# Patient Record
Sex: FEMALE | Race: WHITE | NOT HISPANIC OR LATINO | URBAN - METROPOLITAN AREA
[De-identification: names, ages, dates, MRNs, and addresses within clinical notes are randomized per-mention and may not be internally consistent; named-entity substitution may affect disease eponyms.]

---

## 2022-11-16 ENCOUNTER — INPATIENT (INPATIENT)
Facility: HOSPITAL | Age: 22
LOS: 2 days | Discharge: HOME | End: 2022-11-19
Attending: SURGERY | Admitting: SURGERY

## 2022-11-16 VITALS
RESPIRATION RATE: 20 BRPM | HEIGHT: 63 IN | HEART RATE: 100 BPM | OXYGEN SATURATION: 99 % | DIASTOLIC BLOOD PRESSURE: 58 MMHG | TEMPERATURE: 98 F | SYSTOLIC BLOOD PRESSURE: 119 MMHG | WEIGHT: 116.18 LBS

## 2022-11-16 LAB
ALBUMIN SERPL ELPH-MCNC: 4.6 G/DL — SIGNIFICANT CHANGE UP (ref 3.5–5.2)
ALP SERPL-CCNC: 116 U/L — HIGH (ref 30–115)
ALT FLD-CCNC: 89 U/L — HIGH (ref 0–41)
ANION GAP SERPL CALC-SCNC: 9 MMOL/L — SIGNIFICANT CHANGE UP (ref 7–14)
AST SERPL-CCNC: 209 U/L — HIGH (ref 0–41)
BASOPHILS # BLD AUTO: 0.03 K/UL — SIGNIFICANT CHANGE UP (ref 0–0.2)
BASOPHILS NFR BLD AUTO: 0.3 % — SIGNIFICANT CHANGE UP (ref 0–1)
BILIRUB SERPL-MCNC: 0.5 MG/DL — SIGNIFICANT CHANGE UP (ref 0.2–1.2)
BUN SERPL-MCNC: 15 MG/DL — SIGNIFICANT CHANGE UP (ref 10–20)
CALCIUM SERPL-MCNC: 9.9 MG/DL — SIGNIFICANT CHANGE UP (ref 8.4–10.4)
CHLORIDE SERPL-SCNC: 104 MMOL/L — SIGNIFICANT CHANGE UP (ref 98–110)
CO2 SERPL-SCNC: 28 MMOL/L — SIGNIFICANT CHANGE UP (ref 17–32)
CREAT SERPL-MCNC: 0.7 MG/DL — SIGNIFICANT CHANGE UP (ref 0.7–1.5)
D DIMER BLD IA.RAPID-MCNC: 264 NG/ML DDU — HIGH (ref 0–230)
EGFR: 124 ML/MIN/1.73M2 — SIGNIFICANT CHANGE UP
EOSINOPHIL # BLD AUTO: 0.04 K/UL — SIGNIFICANT CHANGE UP (ref 0–0.7)
EOSINOPHIL NFR BLD AUTO: 0.4 % — SIGNIFICANT CHANGE UP (ref 0–8)
GLUCOSE SERPL-MCNC: 97 MG/DL — SIGNIFICANT CHANGE UP (ref 70–99)
HCG SERPL QL: NEGATIVE — SIGNIFICANT CHANGE UP
HCT VFR BLD CALC: 39.2 % — SIGNIFICANT CHANGE UP (ref 37–47)
HGB BLD-MCNC: 12.7 G/DL — SIGNIFICANT CHANGE UP (ref 12–16)
IMM GRANULOCYTES NFR BLD AUTO: 0.3 % — SIGNIFICANT CHANGE UP (ref 0.1–0.3)
LYMPHOCYTES # BLD AUTO: 1.52 K/UL — SIGNIFICANT CHANGE UP (ref 1.2–3.4)
LYMPHOCYTES # BLD AUTO: 13.8 % — LOW (ref 20.5–51.1)
MAGNESIUM SERPL-MCNC: 2.1 MG/DL — SIGNIFICANT CHANGE UP (ref 1.8–2.4)
MCHC RBC-ENTMCNC: 26.6 PG — LOW (ref 27–31)
MCHC RBC-ENTMCNC: 32.4 G/DL — SIGNIFICANT CHANGE UP (ref 32–37)
MCV RBC AUTO: 82.2 FL — SIGNIFICANT CHANGE UP (ref 81–99)
MONOCYTES # BLD AUTO: 0.73 K/UL — HIGH (ref 0.1–0.6)
MONOCYTES NFR BLD AUTO: 6.6 % — SIGNIFICANT CHANGE UP (ref 1.7–9.3)
NEUTROPHILS # BLD AUTO: 8.65 K/UL — HIGH (ref 1.4–6.5)
NEUTROPHILS NFR BLD AUTO: 78.6 % — HIGH (ref 42.2–75.2)
NRBC # BLD: 0 /100 WBCS — SIGNIFICANT CHANGE UP (ref 0–0)
PLATELET # BLD AUTO: 370 K/UL — SIGNIFICANT CHANGE UP (ref 130–400)
POTASSIUM SERPL-MCNC: 4.3 MMOL/L — SIGNIFICANT CHANGE UP (ref 3.5–5)
POTASSIUM SERPL-SCNC: 4.3 MMOL/L — SIGNIFICANT CHANGE UP (ref 3.5–5)
PROT SERPL-MCNC: 7.2 G/DL — SIGNIFICANT CHANGE UP (ref 6–8)
RBC # BLD: 4.77 M/UL — SIGNIFICANT CHANGE UP (ref 4.2–5.4)
RBC # FLD: 13.4 % — SIGNIFICANT CHANGE UP (ref 11.5–14.5)
SODIUM SERPL-SCNC: 141 MMOL/L — SIGNIFICANT CHANGE UP (ref 135–146)
TROPONIN T SERPL-MCNC: <0.01 NG/ML — SIGNIFICANT CHANGE UP
WBC # BLD: 11 K/UL — HIGH (ref 4.8–10.8)
WBC # FLD AUTO: 11 K/UL — HIGH (ref 4.8–10.8)

## 2022-11-16 PROCEDURE — 93010 ELECTROCARDIOGRAM REPORT: CPT

## 2022-11-16 PROCEDURE — 74177 CT ABD & PELVIS W/CONTRAST: CPT | Mod: 26,MA

## 2022-11-16 PROCEDURE — 71275 CT ANGIOGRAPHY CHEST: CPT | Mod: 26,MA

## 2022-11-16 PROCEDURE — 71046 X-RAY EXAM CHEST 2 VIEWS: CPT | Mod: 26

## 2022-11-16 PROCEDURE — 99285 EMERGENCY DEPT VISIT HI MDM: CPT

## 2022-11-16 RX ORDER — FAMOTIDINE 10 MG/ML
20 INJECTION INTRAVENOUS ONCE
Refills: 0 | Status: COMPLETED | OUTPATIENT
Start: 2022-11-16 | End: 2022-11-16

## 2022-11-16 RX ORDER — KETOROLAC TROMETHAMINE 30 MG/ML
15 SYRINGE (ML) INJECTION ONCE
Refills: 0 | Status: DISCONTINUED | OUTPATIENT
Start: 2022-11-16 | End: 2022-11-16

## 2022-11-16 RX ORDER — SODIUM CHLORIDE 9 MG/ML
1000 INJECTION INTRAMUSCULAR; INTRAVENOUS; SUBCUTANEOUS ONCE
Refills: 0 | Status: COMPLETED | OUTPATIENT
Start: 2022-11-16 | End: 2022-11-16

## 2022-11-16 RX ORDER — SODIUM CHLORIDE 9 MG/ML
1000 INJECTION, SOLUTION INTRAVENOUS ONCE
Refills: 0 | Status: COMPLETED | OUTPATIENT
Start: 2022-11-16 | End: 2022-11-16

## 2022-11-16 RX ADMIN — SODIUM CHLORIDE 1000 MILLILITER(S): 9 INJECTION, SOLUTION INTRAVENOUS at 23:27

## 2022-11-16 NOTE — ED PROVIDER NOTE - ATTENDING APP SHARED VISIT CONTRIBUTION OF CARE
22-year-old female past medical history of liver issues during pregnancy presents with chest pain radiating to bilateral flanks for 1 month states over the last week has been more consistent pain is not producible no history of DVT PE no hematological disorders no family history of sudden cardiac deaths no leg pain.  Well appearing, NAD, non toxic. NCAT PERRLA EOMI neck supple non tender normal wob cta bl tachycardic abdomen s nt nd no rebound no guarding [id number] neuro non focal, patient is tachycardic with recent history of pregnancy will rule out PE.

## 2022-11-16 NOTE — ED PROVIDER NOTE - NS ED ATTENDING STATEMENT MOD
This was a shared visit with the MARISABEL. I reviewed and verified the documentation and independently performed the documented:

## 2022-11-16 NOTE — ED PROVIDER NOTE - OBJECTIVE STATEMENT
Pt is a 22 female with no PMH presents to ED with complaints of chest pain. Pt states for past 1 month has been having intermittent episodes of chest pain. Pt states over last 1 week pain has become more consistent. Pain is non reproducible. There is no alleviating or aggravating factors. Denies hx of PE/DVT, no heme disorders or FH, no FH of sudden cardiac death. Denies recent surgeries. Denies use of OCP and no recent travel. Denies fever, chills, bodyaches, sob, abdominal pain, NVCD

## 2022-11-16 NOTE — ED PROVIDER NOTE - PROGRESS NOTE DETAILS
nn: Liver enzymes elevated CT scan shows contracted gallbladder gallbladder is not visualized as well we will order ultrasound of the right upper quadrant Consulted surgery, will evaluate patient. CP: GI recommends medicine admission to follow and trend LFTs

## 2022-11-17 LAB
ALBUMIN SERPL ELPH-MCNC: 4.2 G/DL — SIGNIFICANT CHANGE UP (ref 3.5–5.2)
ALBUMIN SERPL ELPH-MCNC: 4.6 G/DL — SIGNIFICANT CHANGE UP (ref 3.5–5.2)
ALP SERPL-CCNC: 127 U/L — HIGH (ref 30–115)
ALP SERPL-CCNC: 160 U/L — HIGH (ref 30–115)
ALT FLD-CCNC: 407 U/L — HIGH (ref 0–41)
ALT FLD-CCNC: 533 U/L — HIGH (ref 0–41)
ANION GAP SERPL CALC-SCNC: 10 MMOL/L — SIGNIFICANT CHANGE UP (ref 7–14)
AST SERPL-CCNC: 439 U/L — HIGH (ref 0–41)
AST SERPL-CCNC: 674 U/L — HIGH (ref 0–41)
BILIRUB DIRECT SERPL-MCNC: 0.2 MG/DL — SIGNIFICANT CHANGE UP (ref 0–0.3)
BILIRUB DIRECT SERPL-MCNC: 0.6 MG/DL — HIGH (ref 0–0.3)
BILIRUB INDIRECT FLD-MCNC: 0.4 MG/DL — SIGNIFICANT CHANGE UP (ref 0.2–1.2)
BILIRUB INDIRECT FLD-MCNC: 0.7 MG/DL — SIGNIFICANT CHANGE UP (ref 0.2–1.2)
BILIRUB SERPL-MCNC: 0.9 MG/DL — SIGNIFICANT CHANGE UP (ref 0.2–1.2)
BILIRUB SERPL-MCNC: 1 MG/DL — SIGNIFICANT CHANGE UP (ref 0.2–1.2)
BUN SERPL-MCNC: 8 MG/DL — LOW (ref 10–20)
CALCIUM SERPL-MCNC: 9.3 MG/DL — SIGNIFICANT CHANGE UP (ref 8.4–10.5)
CHLORIDE SERPL-SCNC: 102 MMOL/L — SIGNIFICANT CHANGE UP (ref 98–110)
CO2 SERPL-SCNC: 26 MMOL/L — SIGNIFICANT CHANGE UP (ref 17–32)
CREAT SERPL-MCNC: 0.7 MG/DL — SIGNIFICANT CHANGE UP (ref 0.7–1.5)
EGFR: 124 ML/MIN/1.73M2 — SIGNIFICANT CHANGE UP
GLUCOSE SERPL-MCNC: 99 MG/DL — SIGNIFICANT CHANGE UP (ref 70–99)
HCT VFR BLD CALC: 38.8 % — SIGNIFICANT CHANGE UP (ref 37–47)
HGB BLD-MCNC: 12.6 G/DL — SIGNIFICANT CHANGE UP (ref 12–16)
MAGNESIUM SERPL-MCNC: 2.3 MG/DL — SIGNIFICANT CHANGE UP (ref 1.8–2.4)
MCHC RBC-ENTMCNC: 26.6 PG — LOW (ref 27–31)
MCHC RBC-ENTMCNC: 32.5 G/DL — SIGNIFICANT CHANGE UP (ref 32–37)
MCV RBC AUTO: 81.9 FL — SIGNIFICANT CHANGE UP (ref 81–99)
NRBC # BLD: 0 /100 WBCS — SIGNIFICANT CHANGE UP (ref 0–0)
PHOSPHATE SERPL-MCNC: 3.1 MG/DL — SIGNIFICANT CHANGE UP (ref 2.1–4.9)
PLATELET # BLD AUTO: 367 K/UL — SIGNIFICANT CHANGE UP (ref 130–400)
POTASSIUM SERPL-MCNC: 4.2 MMOL/L — SIGNIFICANT CHANGE UP (ref 3.5–5)
POTASSIUM SERPL-SCNC: 4.2 MMOL/L — SIGNIFICANT CHANGE UP (ref 3.5–5)
PROT SERPL-MCNC: 6.3 G/DL — SIGNIFICANT CHANGE UP (ref 6–8)
PROT SERPL-MCNC: 6.8 G/DL — SIGNIFICANT CHANGE UP (ref 6–8)
RBC # BLD: 4.74 M/UL — SIGNIFICANT CHANGE UP (ref 4.2–5.4)
RBC # FLD: 13.5 % — SIGNIFICANT CHANGE UP (ref 11.5–14.5)
SARS-COV-2 RNA SPEC QL NAA+PROBE: SIGNIFICANT CHANGE UP
SODIUM SERPL-SCNC: 138 MMOL/L — SIGNIFICANT CHANGE UP (ref 135–146)
WBC # BLD: 5.16 K/UL — SIGNIFICANT CHANGE UP (ref 4.8–10.8)
WBC # FLD AUTO: 5.16 K/UL — SIGNIFICANT CHANGE UP (ref 4.8–10.8)

## 2022-11-17 PROCEDURE — 76705 ECHO EXAM OF ABDOMEN: CPT | Mod: 26

## 2022-11-17 PROCEDURE — 99223 1ST HOSP IP/OBS HIGH 75: CPT | Mod: 57

## 2022-11-17 PROCEDURE — 99223 1ST HOSP IP/OBS HIGH 75: CPT

## 2022-11-17 RX ORDER — AMPICILLIN SODIUM AND SULBACTAM SODIUM 250; 125 MG/ML; MG/ML
3 INJECTION, POWDER, FOR SUSPENSION INTRAMUSCULAR; INTRAVENOUS ONCE
Refills: 0 | Status: COMPLETED | OUTPATIENT
Start: 2022-11-17 | End: 2022-11-17

## 2022-11-17 RX ORDER — SODIUM CHLORIDE 9 MG/ML
1000 INJECTION, SOLUTION INTRAVENOUS
Refills: 0 | Status: DISCONTINUED | OUTPATIENT
Start: 2022-11-17 | End: 2022-11-18

## 2022-11-17 RX ORDER — AMPICILLIN SODIUM AND SULBACTAM SODIUM 250; 125 MG/ML; MG/ML
3 INJECTION, POWDER, FOR SUSPENSION INTRAMUSCULAR; INTRAVENOUS EVERY 6 HOURS
Refills: 0 | Status: DISCONTINUED | OUTPATIENT
Start: 2022-11-17 | End: 2022-11-18

## 2022-11-17 RX ORDER — CHLORHEXIDINE GLUCONATE 213 G/1000ML
1 SOLUTION TOPICAL
Refills: 0 | Status: DISCONTINUED | OUTPATIENT
Start: 2022-11-17 | End: 2022-11-18

## 2022-11-17 RX ORDER — AMPICILLIN SODIUM AND SULBACTAM SODIUM 250; 125 MG/ML; MG/ML
INJECTION, POWDER, FOR SUSPENSION INTRAMUSCULAR; INTRAVENOUS
Refills: 0 | Status: DISCONTINUED | OUTPATIENT
Start: 2022-11-17 | End: 2022-11-18

## 2022-11-17 RX ORDER — OXYCODONE HYDROCHLORIDE 5 MG/1
5 TABLET ORAL EVERY 6 HOURS
Refills: 0 | Status: DISCONTINUED | OUTPATIENT
Start: 2022-11-17 | End: 2022-11-18

## 2022-11-17 RX ORDER — ENOXAPARIN SODIUM 100 MG/ML
40 INJECTION SUBCUTANEOUS EVERY 24 HOURS
Refills: 0 | Status: DISCONTINUED | OUTPATIENT
Start: 2022-11-17 | End: 2022-11-18

## 2022-11-17 RX ORDER — INFLUENZA VIRUS VACCINE 15; 15; 15; 15 UG/.5ML; UG/.5ML; UG/.5ML; UG/.5ML
0.5 SUSPENSION INTRAMUSCULAR ONCE
Refills: 0 | Status: DISCONTINUED | OUTPATIENT
Start: 2022-11-17 | End: 2022-11-19

## 2022-11-17 RX ORDER — PANTOPRAZOLE SODIUM 20 MG/1
40 TABLET, DELAYED RELEASE ORAL
Refills: 0 | Status: DISCONTINUED | OUTPATIENT
Start: 2022-11-17 | End: 2022-11-18

## 2022-11-17 RX ORDER — KETOROLAC TROMETHAMINE 30 MG/ML
15 SYRINGE (ML) INJECTION EVERY 6 HOURS
Refills: 0 | Status: DISCONTINUED | OUTPATIENT
Start: 2022-11-17 | End: 2022-11-18

## 2022-11-17 RX ORDER — ACETAMINOPHEN 500 MG
650 TABLET ORAL EVERY 6 HOURS
Refills: 0 | Status: DISCONTINUED | OUTPATIENT
Start: 2022-11-17 | End: 2022-11-18

## 2022-11-17 RX ADMIN — AMPICILLIN SODIUM AND SULBACTAM SODIUM 200 GRAM(S): 250; 125 INJECTION, POWDER, FOR SUSPENSION INTRAMUSCULAR; INTRAVENOUS at 08:58

## 2022-11-17 RX ADMIN — AMPICILLIN SODIUM AND SULBACTAM SODIUM 200 GRAM(S): 250; 125 INJECTION, POWDER, FOR SUSPENSION INTRAMUSCULAR; INTRAVENOUS at 12:47

## 2022-11-17 RX ADMIN — AMPICILLIN SODIUM AND SULBACTAM SODIUM 200 GRAM(S): 250; 125 INJECTION, POWDER, FOR SUSPENSION INTRAMUSCULAR; INTRAVENOUS at 17:49

## 2022-11-17 RX ADMIN — AMPICILLIN SODIUM AND SULBACTAM SODIUM 200 GRAM(S): 250; 125 INJECTION, POWDER, FOR SUSPENSION INTRAMUSCULAR; INTRAVENOUS at 00:05

## 2022-11-17 NOTE — H&P ADULT - ATTENDING COMMENTS
This is  21 y/o female with PMHx of cholelithiasis, presents to ED with chest pain/epigastric pain and b/l flank pain. Pain has been intermittent for the past month but has become more consistent in the last week. Associated with nausea, but denies emesis or fevers. Characterized as a squeezing sensation. Denies any alleviating or aggravating factors. Patient reports improvement of pain since arriving to the ED.     PE:  AAO x3  Chest : clear.  CV: rrr  Abdomen: moderately tender in the RUQ and epigastrium, no rebound    All images and labs were reviewed.     ASSESSMENT:  21 y/o female with Acute Calculous Cholecystitis.  Elevated LFTs.  Possible Choledocholithiasis.    PLAN:  - NPO, IVF  - put on Unasyn iv  - GI eval for possible EUS/ERCP    Will book for Laparoscopic Cholecystectomy, Possible Open, Possible Intraoperative Cholangiogram.    Informed consent was obtained from the patient after explaining all the risks and benefits of the procedure including but not limited to infection, bleeding and etc. She understood and agreed. All questions were answered.

## 2022-11-17 NOTE — H&P ADULT - ASSESSMENT
ASSESSMENT:  22yF w/ PMHx of intrahepatic cholelithiasis of pregnancy presents to ED with chest pain/epigastric pain and b/l flank pain. Pain has been intermittent for the past month but has become more consistent in the last week. Associated with nausea, but denies emesis or fevers. Characterized as a squeezing sensation.. Physical exam findings, imaging, and labs as documented above.     PLAN:  -Admit to surgical service under Dr. Pizarro  -NPO w/ IVF  -GI consult for transaminitis   -F/U 11am labs   -Pain control   -GI ppx     Above plan discussed with Attending Surgeon Dr. Pizarro, patient, patient family, and Primary team  11-17-22 @ 08:02    Trauma SPECTRA: [id number]

## 2022-11-17 NOTE — CONSULT NOTE ADULT - NS ATTEND AMEND GEN_ALL_CORE FT
Patient seen and examined on the GI–advance endoscopy rounds, case reviewed and discussed with team, assessment and plan as above

## 2022-11-17 NOTE — CONSULT NOTE ADULT - ASSESSMENT
Patient is a 22 female with PMHx of intrahepatic cholelithiasis of pregnancy who presents to ED with chest pain/epigastric pain and b/l flank pain. Pain has been intermittent for the past month but has become more consistent in the last week. Associated with nausea, but denies emesis or fevers. Characterized as a squeezing sensation. Denies any alleviating or aggravating factors. Patient reports improvement of pain since arriving to the ED. Advanced consulted for LFT abnormalities. Pattern not concerning and CBD not dilated. Would benefit from CCY. No plan for ERCP or EUS at this time.    Biliary colic  - Ongoing biliary colic- would benefit from CCY  - CBD is 4mm and T hugo is 1, very low suspicion by criteria for CBD stone  - Proceed with CCY and can do IOC if surgical team concerned for CBD stone   - If IOC positive can consider ERCP Patient is a 22 female with PMHx of intrahepatic cholelithiasis of pregnancy who presents to ED with chest pain/epigastric pain and b/l flank pain. Pain has been intermittent for the past month but has become more consistent in the last week. Associated with nausea, but denies emesis or fevers. Characterized as a squeezing sensation. Denies any alleviating or aggravating factors. Patient reports improvement of pain since arriving to the ED. Advanced consulted for LFT abnormalities. Pattern not concerning and CBD not dilated. Would benefit from CCY. No plan for ERCP or EUS at this time.    Biliary colic  - Ongoing biliary colic- would benefit from LAP CCY  - CBD is 4mm and T hugo is 1, very low suspicion by criteria for CBD stone  - Proceed with CCY and can do IOC if surgical team concerned for CBD stone   - If IOC positive can consider ERCP

## 2022-11-17 NOTE — H&P ADULT - NSHPLABSRESULTS_GEN_ALL_CORE
LAB/STUDIES:                        12.7   11.00 )-----------( 370      ( 16 Nov 2022 18:35 )             39.2     11-16    141  |  104  |  15  ----------------------------<  97  4.3   |  28  |  0.7    Ca    9.9      16 Nov 2022 18:35  Mg     2.1     11-16    TPro  6.3  /  Alb  4.2  /  TBili  1.0  /  DBili  0.6<H>  /  AST  674<H>  /  ALT  407<H>  /  AlkPhos  127<H>  11-17    LIVER FUNCTIONS - ( 17 Nov 2022 01:20 )  Alb: 4.2 g/dL / Pro: 6.3 g/dL / ALK PHOS: 127 U/L / ALT: 407 U/L / AST: 674 U/L / GGT: x           CARDIAC MARKERS ( 16 Nov 2022 18:35 )  x     / <0.01 ng/mL / x     / x     / x        IMAGING:  < from: US Abdomen Upper Quadrant Right (11.17.22 @ 00:26) >  IMPRESSION:  Cholelithiasis without sonographic evidence of acute cholecystitis.   Otherwise unremarkable exam.  --- End of Report ---    < from: CT Abdomen and Pelvis w/ IV Cont (11.16.22 @ 21:41) >  IMPRESSION:  1. No evidence of pulmonary embolus or other acute intrathoracic   pathology.    2. Contracted gallbladder, poorly evaluated on CT; if there is clinical   suspicion for gallbladder pathology, dedicated right upper quadrant   ultrasound may be of use.    3. Otherwise, no definite evidence of acute/inflammatory process within   the abdomen or pelvis.  --- End of Report ---

## 2022-11-17 NOTE — CONSULT NOTE ADULT - ASSESSMENT
ASSESSMENT:  22yF w/ PMHx of intrahepatic cholelithiasis of pregnancy presents to ED with chest pain/epigastric pain and b/l flank pain. Pain has been intermittent for the past month but has become more consistent in the last week. Associated with nausea, but denies emesis or fevers. Characterized as a squeezing sensation.. Physical exam findings, imaging, and labs as documented above.     PLAN:  -Abdominal pain has resolved since arriving to the hospital  -No acute surgical intervention   -Low fat diet  -Patient may benefit from PPI  -PO trail in ED  -Patient can follow up as an outpatient with Dr. Pizarro on Tuesday 11/22/22 (patient told to call office in the morning)   -Dispo per ED     Above plan discussed with Attending Surgeon Dr. Pizarro, patient, patient family, and Primary team  11-17-22 @ 04:25    Trauma SPECTRA: [id number]

## 2022-11-17 NOTE — PATIENT PROFILE ADULT - FALL HARM RISK - UNIVERSAL INTERVENTIONS
Bed in lowest position, wheels locked, appropriate side rails in place/Call bell, personal items and telephone in reach/Instruct patient to call for assistance before getting out of bed or chair/Non-slip footwear when patient is out of bed/[place] to call system/Physically safe environment - no spills, clutter or unnecessary equipment/Purposeful Proactive Rounding/Room/bathroom lighting operational, light cord in reach

## 2022-11-17 NOTE — H&P ADULT - HISTORY OF PRESENT ILLNESS
Patient is a 22 female with PMHx of intrahepatic cholelithiasis of pregnancy presents to ED with chest pain/epigastric pain and b/l flank pain. Pain has been intermittent for the past month but has become more consistent in the last week. Associated with nausea, but denies emesis or fevers. Characterized as a squeezing sensation. Denies any alleviating or aggravating factors. Patient reports improvement of pain since arriving to the ED.

## 2022-11-17 NOTE — H&P ADULT - NSHPPHYSICALEXAM_GEN_ALL_CORE
PHYSICAL EXAM:  General: NAD, AAOx3, calm and cooperative  HEENT: NCAT, EOMI  Cardiac: S1, S2  Respiratory: normal respiratory effort, bilateral breath sounds   Abdomen: Soft, non-distended, non-tender, no rebound, no guarding  Vascular: extremities well perfused  Skin: Warm/dry, normal color, no jaundice

## 2022-11-17 NOTE — CONSULT NOTE ADULT - SUBJECTIVE AND OBJECTIVE BOX
Patient is a 22 female with PMHx of intrahepatic cholelithiasis of pregnancy who presents to ED with chest pain/epigastric pain and b/l flank pain. Pain has been intermittent for the past month but has become more consistent in the last week. Associated with nausea, but denies emesis or fevers. Characterized as a squeezing sensation. Denies any alleviating or aggravating factors. Patient reports improvement of pain since arriving to the ED. Advanced consulted for LFT abnormalities.        PAST MEDICAL & SURGICAL HISTORY:  Intrahepatic cholestasis of pregnancy  No significant past surgical history      Social History  Denies Current Tobacco use  Denies Current ETOH use  Denies Current Illicit Drug use       Family Hx:  Father: Non Contributory   Mother: Non Contributory      MEDICATIONS  (STANDING):  ampicillin/sulbactam  IVPB      ampicillin/sulbactam  IVPB 3 Gram(s) IV Intermittent every 6 hours  chlorhexidine 4% Liquid 1 Application(s) Topical <User Schedule>  influenza   Vaccine 0.5 milliLiter(s) IntraMuscular once  lactated ringers. 1000 milliLiter(s) (100 mL/Hr) IV Continuous <Continuous>  pantoprazole    Tablet 40 milliGRAM(s) Oral before breakfast    MEDICATIONS  (PRN):  acetaminophen     Tablet .. 650 milliGRAM(s) Oral every 6 hours PRN Temp greater or equal to 38C (100.4F), Mild Pain (1 - 3)  ketorolac   Injectable 15 milliGRAM(s) IV Push every 6 hours PRN Moderate Pain (4 - 6)  oxyCODONE    IR 5 milliGRAM(s) Oral every 6 hours PRN Severe Pain (7 - 10)      Allergies  No Known Allergies      Review of Systems  General:  Denies Fatigue, Denies Fever, Denies Weakness ,Denies Weight Loss   HEENT: Denies Trouble Swallowing ,Denies  Sore Throat , Denies Change in hearing/vision/speech ,Denies Dizziness    Cardio: Denies  Chest Pain , Palpitations    Respiratory: Denies worsening of SOB, Denies Cough  Abdomen: See detailed HPI  Neuro: Denies Headache Denies Dizziness, Denies Paresthesias  MSK: Denies pain in Bones/Joints/Muscles   Psych: Patient denies depression, denies suicidal or homicidal ideations  Integ: Patient Denies rash, or new skin lesions     Vital Signs Last 24 Hrs  T(C): 35.7 (17 Nov 2022 06:07), Max: 36.8 (16 Nov 2022 20:19)  T(F): 96.2 (17 Nov 2022 06:07), Max: 98.2 (16 Nov 2022 20:19)  HR: 79 (17 Nov 2022 06:07) (79 - 100)  BP: 113/56 (17 Nov 2022 06:07) (113/56 - 121/61)  BP(mean): --  RR: 18 (17 Nov 2022 06:07) (18 - 20)  SpO2: 99% (16 Nov 2022 20:19) (99% - 99%)    Parameters below as of 16 Nov 2022 17:38  Patient On (Oxygen Delivery Method): room air    Physical Exam  Gen: NAD  HEENT: NC/AT, Mucosal Membranes  Cardio: S1/S2 No S3/S4, Regular  Resp: CTA B/L  Abdomen: Soft, ND/NT  Neuro: AAOx3, Cranial Nerve II-XII intact   Extremities: FROM x 4  Skin: no jaundice     Labs:                          12.7   11.00 )-----------( 370      ( 16 Nov 2022 18:35 )             39.2       Auto Neutrophil %: 78.6 % (11-16-22 @ 18:35)  Auto Immature Granulocyte %: 0.3 % (11-16-22 @ 18:35)    11-16    141  |  104  |  15  ----------------------------<  97  4.3   |  28  |  0.7      Calcium, Total Serum: 9.9 mg/dL (11-16-22 @ 18:35)      LFTs:             6.3  | 1.0  | 674      ------------------[127     ( 17 Nov 2022 01:20 )  4.2  | 0.6  | 407           CARDIAC MARKERS ( 16 Nov 2022 18:35 )  x     / <0.01 ng/mL / x     / x     / x          RADIOLOGY & ADDITIONAL STUDIES:  US Abdomen Upper Quadrant Right 11.17.22   IMPRESSION:  Cholelithiasis without sonographic evidence of acute cholecystitis.   Otherwise unremarkable exam.  CBD 4mm      CT Abdomen and Pelvis w/ IV Cont 11.16.22   IMPRESSION:    1. No evidence of pulmonary embolus or other acute intrathoracic   pathology.    2. Contracted gallbladder, poorly evaluated on CT; if there is clinical   suspicion for gallbladder pathology, dedicated right upper quadrant   ultrasound may be of use.    3. Otherwise, no definite evidence of acute/inflammatory process within   the abdomen or pelvis.          
GENERAL SURGERY CONSULT NOTE    Patient: UCHE MAE , 22y ([birth year])Female   MRN: [id number]  Location: [place] [address] A  Visit: 11-17-22 Inpatient  Date: 11-17-22 @ 04:25    HPI:  Patient is a 22 female with PMHx of intrahepatic cholelithiasis of pregnancy presents to ED with chest pain/epigastric pain and b/l flank pain. Pain has been intermittent for the past month but has become more consistent in the last week. Associated with nausea, but denies emesis or fevers. Characterized as a squeezing sensation. Denies any alleviating or aggravating factors. Patient reports improvement of pain since arriving to the ED.    PAST MEDICAL & SURGICAL HISTORY:    Home Medications:    VITALS:  T(F): 98.2 (11-16-22 @ 20:19), Max: 98.2 (11-16-22 @ 20:19)  HR: 81 (11-16-22 @ 20:19) (81 - 100)  BP: 121/61 (11-16-22 @ 20:19) (119/58 - 121/61)  RR: 20 (11-16-22 @ 20:19) (20 - 20)  SpO2: 99% (11-16-22 @ 20:19) (99% - 99%)    PHYSICAL EXAM:  General: NAD, AAOx3, calm and cooperative  HEENT: NCAT, EOMI  Cardiac: S1, S2  Respiratory: normal respiratory effort, bilateral breath sounds   Abdomen: Soft, non-distended, non-tender, no rebound, no guarding  Vascular: extremities well perfused  Skin: Warm/dry, normal color, no jaundice    MEDICATIONS  (STANDING):    MEDICATIONS  (PRN):    LAB/STUDIES:                        12.7   11.00 )-----------( 370      ( 16 Nov 2022 18:35 )             39.2     11-16    141  |  104  |  15  ----------------------------<  97  4.3   |  28  |  0.7    Ca    9.9      16 Nov 2022 18:35  Mg     2.1     11-16    TPro  6.3  /  Alb  4.2  /  TBili  1.0  /  DBili  0.6<H>  /  AST  674<H>  /  ALT  407<H>  /  AlkPhos  127<H>  11-17    LIVER FUNCTIONS - ( 17 Nov 2022 01:20 )  Alb: 4.2 g/dL / Pro: 6.3 g/dL / ALK PHOS: 127 U/L / ALT: 407 U/L / AST: 674 U/L / GGT: x           CARDIAC MARKERS ( 16 Nov 2022 18:35 )  x     / <0.01 ng/mL / x     / x     / x        IMAGING:  < from: US Abdomen Upper Quadrant Right (11.17.22 @ 00:26) >  IMPRESSION:  Cholelithiasis without sonographic evidence of acute cholecystitis.   Otherwise unremarkable exam.  --- End of Report ---    < from: CT Abdomen and Pelvis w/ IV Cont (11.16.22 @ 21:41) >  IMPRESSION:  1. No evidence of pulmonary embolus or other acute intrathoracic   pathology.    2. Contracted gallbladder, poorly evaluated on CT; if there is clinical   suspicion for gallbladder pathology, dedicated right upper quadrant   ultrasound may be of use.    3. Otherwise, no definite evidence of acute/inflammatory process within   the abdomen or pelvis.  --- End of Report ---

## 2022-11-18 ENCOUNTER — TRANSCRIPTION ENCOUNTER (OUTPATIENT)
Age: 22
End: 2022-11-18

## 2022-11-18 ENCOUNTER — RESULT REVIEW (OUTPATIENT)
Age: 22
End: 2022-11-18

## 2022-11-18 LAB
ALBUMIN SERPL ELPH-MCNC: 4.1 G/DL — SIGNIFICANT CHANGE UP (ref 3.5–5.2)
ALBUMIN SERPL ELPH-MCNC: 4.2 G/DL — SIGNIFICANT CHANGE UP (ref 3.5–5.2)
ALP SERPL-CCNC: 123 U/L — HIGH (ref 30–115)
ALP SERPL-CCNC: 135 U/L — HIGH (ref 30–115)
ALT FLD-CCNC: 250 U/L — HIGH (ref 0–41)
ALT FLD-CCNC: 360 U/L — HIGH (ref 0–41)
ANION GAP SERPL CALC-SCNC: 12 MMOL/L — SIGNIFICANT CHANGE UP (ref 7–14)
ANION GAP SERPL CALC-SCNC: 9 MMOL/L — SIGNIFICANT CHANGE UP (ref 7–14)
APTT BLD: 32.2 SEC — SIGNIFICANT CHANGE UP (ref 27–39.2)
AST SERPL-CCNC: 183 U/L — HIGH (ref 0–41)
AST SERPL-CCNC: 71 U/L — HIGH (ref 0–41)
BASOPHILS # BLD AUTO: 0.02 K/UL — SIGNIFICANT CHANGE UP (ref 0–0.2)
BASOPHILS # BLD AUTO: 0.02 K/UL — SIGNIFICANT CHANGE UP (ref 0–0.2)
BASOPHILS NFR BLD AUTO: 0.1 % — SIGNIFICANT CHANGE UP (ref 0–1)
BASOPHILS NFR BLD AUTO: 0.4 % — SIGNIFICANT CHANGE UP (ref 0–1)
BILIRUB DIRECT SERPL-MCNC: 0.2 MG/DL — SIGNIFICANT CHANGE UP (ref 0–0.3)
BILIRUB DIRECT SERPL-MCNC: <0.2 MG/DL — SIGNIFICANT CHANGE UP (ref 0–0.3)
BILIRUB INDIRECT FLD-MCNC: 0.6 MG/DL — SIGNIFICANT CHANGE UP (ref 0.2–1.2)
BILIRUB INDIRECT FLD-MCNC: >0.3 MG/DL — SIGNIFICANT CHANGE UP (ref 0.2–1.2)
BILIRUB SERPL-MCNC: 0.5 MG/DL — SIGNIFICANT CHANGE UP (ref 0.2–1.2)
BILIRUB SERPL-MCNC: 0.8 MG/DL — SIGNIFICANT CHANGE UP (ref 0.2–1.2)
BLD GP AB SCN SERPL QL: SIGNIFICANT CHANGE UP
BLD GP AB SCN SERPL QL: SIGNIFICANT CHANGE UP
BUN SERPL-MCNC: 7 MG/DL — LOW (ref 10–20)
BUN SERPL-MCNC: 8 MG/DL — LOW (ref 10–20)
CALCIUM SERPL-MCNC: 8.9 MG/DL — SIGNIFICANT CHANGE UP (ref 8.4–10.5)
CALCIUM SERPL-MCNC: 9.4 MG/DL — SIGNIFICANT CHANGE UP (ref 8.4–10.4)
CHLORIDE SERPL-SCNC: 103 MMOL/L — SIGNIFICANT CHANGE UP (ref 98–110)
CHLORIDE SERPL-SCNC: 105 MMOL/L — SIGNIFICANT CHANGE UP (ref 98–110)
CO2 SERPL-SCNC: 22 MMOL/L — SIGNIFICANT CHANGE UP (ref 17–32)
CO2 SERPL-SCNC: 24 MMOL/L — SIGNIFICANT CHANGE UP (ref 17–32)
CREAT SERPL-MCNC: 0.6 MG/DL — LOW (ref 0.7–1.5)
CREAT SERPL-MCNC: 0.6 MG/DL — LOW (ref 0.7–1.5)
EGFR: 128 ML/MIN/1.73M2 — SIGNIFICANT CHANGE UP
EGFR: 128 ML/MIN/1.73M2 — SIGNIFICANT CHANGE UP
EOSINOPHIL # BLD AUTO: 0 K/UL — SIGNIFICANT CHANGE UP (ref 0–0.7)
EOSINOPHIL # BLD AUTO: 0.13 K/UL — SIGNIFICANT CHANGE UP (ref 0–0.7)
EOSINOPHIL NFR BLD AUTO: 0 % — SIGNIFICANT CHANGE UP (ref 0–8)
EOSINOPHIL NFR BLD AUTO: 2.4 % — SIGNIFICANT CHANGE UP (ref 0–8)
GLUCOSE SERPL-MCNC: 137 MG/DL — HIGH (ref 70–99)
GLUCOSE SERPL-MCNC: 97 MG/DL — SIGNIFICANT CHANGE UP (ref 70–99)
HCT VFR BLD CALC: 35.1 % — LOW (ref 37–47)
HCT VFR BLD CALC: 35.4 % — LOW (ref 37–47)
HGB BLD-MCNC: 11.7 G/DL — LOW (ref 12–16)
HGB BLD-MCNC: 11.9 G/DL — LOW (ref 12–16)
IMM GRANULOCYTES NFR BLD AUTO: 0.3 % — SIGNIFICANT CHANGE UP (ref 0.1–0.3)
IMM GRANULOCYTES NFR BLD AUTO: 0.4 % — HIGH (ref 0.1–0.3)
INR BLD: 1.06 RATIO — SIGNIFICANT CHANGE UP (ref 0.65–1.3)
LYMPHOCYTES # BLD AUTO: 1.32 K/UL — SIGNIFICANT CHANGE UP (ref 1.2–3.4)
LYMPHOCYTES # BLD AUTO: 2.05 K/UL — SIGNIFICANT CHANGE UP (ref 1.2–3.4)
LYMPHOCYTES # BLD AUTO: 38.2 % — SIGNIFICANT CHANGE UP (ref 20.5–51.1)
LYMPHOCYTES # BLD AUTO: 8.3 % — LOW (ref 20.5–51.1)
MAGNESIUM SERPL-MCNC: 1.7 MG/DL — LOW (ref 1.8–2.4)
MAGNESIUM SERPL-MCNC: 2 MG/DL — SIGNIFICANT CHANGE UP (ref 1.8–2.4)
MCHC RBC-ENTMCNC: 27.2 PG — SIGNIFICANT CHANGE UP (ref 27–31)
MCHC RBC-ENTMCNC: 27.2 PG — SIGNIFICANT CHANGE UP (ref 27–31)
MCHC RBC-ENTMCNC: 33.3 G/DL — SIGNIFICANT CHANGE UP (ref 32–37)
MCHC RBC-ENTMCNC: 33.6 G/DL — SIGNIFICANT CHANGE UP (ref 32–37)
MCV RBC AUTO: 80.8 FL — LOW (ref 81–99)
MCV RBC AUTO: 81.6 FL — SIGNIFICANT CHANGE UP (ref 81–99)
MONOCYTES # BLD AUTO: 0.36 K/UL — SIGNIFICANT CHANGE UP (ref 0.1–0.6)
MONOCYTES # BLD AUTO: 0.69 K/UL — HIGH (ref 0.1–0.6)
MONOCYTES NFR BLD AUTO: 4.3 % — SIGNIFICANT CHANGE UP (ref 1.7–9.3)
MONOCYTES NFR BLD AUTO: 6.7 % — SIGNIFICANT CHANGE UP (ref 1.7–9.3)
NEUTROPHILS # BLD AUTO: 13.9 K/UL — HIGH (ref 1.4–6.5)
NEUTROPHILS # BLD AUTO: 2.78 K/UL — SIGNIFICANT CHANGE UP (ref 1.4–6.5)
NEUTROPHILS NFR BLD AUTO: 51.9 % — SIGNIFICANT CHANGE UP (ref 42.2–75.2)
NEUTROPHILS NFR BLD AUTO: 87 % — HIGH (ref 42.2–75.2)
NRBC # BLD: 0 /100 WBCS — SIGNIFICANT CHANGE UP (ref 0–0)
NRBC # BLD: 0 /100 WBCS — SIGNIFICANT CHANGE UP (ref 0–0)
PHOSPHATE SERPL-MCNC: 3.7 MG/DL — SIGNIFICANT CHANGE UP (ref 2.1–4.9)
PHOSPHATE SERPL-MCNC: 3.8 MG/DL — SIGNIFICANT CHANGE UP (ref 2.1–4.9)
PLATELET # BLD AUTO: 302 K/UL — SIGNIFICANT CHANGE UP (ref 130–400)
PLATELET # BLD AUTO: 337 K/UL — SIGNIFICANT CHANGE UP (ref 130–400)
POTASSIUM SERPL-MCNC: 3.8 MMOL/L — SIGNIFICANT CHANGE UP (ref 3.5–5)
POTASSIUM SERPL-MCNC: 4.3 MMOL/L — SIGNIFICANT CHANGE UP (ref 3.5–5)
POTASSIUM SERPL-SCNC: 3.8 MMOL/L — SIGNIFICANT CHANGE UP (ref 3.5–5)
POTASSIUM SERPL-SCNC: 4.3 MMOL/L — SIGNIFICANT CHANGE UP (ref 3.5–5)
PROT SERPL-MCNC: 6.2 G/DL — SIGNIFICANT CHANGE UP (ref 6–8)
PROT SERPL-MCNC: 6.5 G/DL — SIGNIFICANT CHANGE UP (ref 6–8)
PROTHROM AB SERPL-ACNC: 12.1 SEC — SIGNIFICANT CHANGE UP (ref 9.95–12.87)
RBC # BLD: 4.3 M/UL — SIGNIFICANT CHANGE UP (ref 4.2–5.4)
RBC # BLD: 4.38 M/UL — SIGNIFICANT CHANGE UP (ref 4.2–5.4)
RBC # FLD: 13.2 % — SIGNIFICANT CHANGE UP (ref 11.5–14.5)
RBC # FLD: 13.6 % — SIGNIFICANT CHANGE UP (ref 11.5–14.5)
SODIUM SERPL-SCNC: 137 MMOL/L — SIGNIFICANT CHANGE UP (ref 135–146)
SODIUM SERPL-SCNC: 138 MMOL/L — SIGNIFICANT CHANGE UP (ref 135–146)
WBC # BLD: 15.97 K/UL — HIGH (ref 4.8–10.8)
WBC # BLD: 5.36 K/UL — SIGNIFICANT CHANGE UP (ref 4.8–10.8)
WBC # FLD AUTO: 15.97 K/UL — HIGH (ref 4.8–10.8)
WBC # FLD AUTO: 5.36 K/UL — SIGNIFICANT CHANGE UP (ref 4.8–10.8)

## 2022-11-18 PROCEDURE — 47562 LAPAROSCOPIC CHOLECYSTECTOMY: CPT

## 2022-11-18 PROCEDURE — 88304 TISSUE EXAM BY PATHOLOGIST: CPT | Mod: 26

## 2022-11-18 RX ORDER — CHLORHEXIDINE GLUCONATE 213 G/1000ML
1 SOLUTION TOPICAL
Refills: 0 | Status: DISCONTINUED | OUTPATIENT
Start: 2022-11-18 | End: 2022-11-19

## 2022-11-18 RX ORDER — ONDANSETRON 8 MG/1
4 TABLET, FILM COATED ORAL ONCE
Refills: 0 | Status: DISCONTINUED | OUTPATIENT
Start: 2022-11-18 | End: 2022-11-19

## 2022-11-18 RX ORDER — HYDROMORPHONE HYDROCHLORIDE 2 MG/ML
0.5 INJECTION INTRAMUSCULAR; INTRAVENOUS; SUBCUTANEOUS
Refills: 0 | Status: DISCONTINUED | OUTPATIENT
Start: 2022-11-18 | End: 2022-11-19

## 2022-11-18 RX ORDER — POTASSIUM CHLORIDE 20 MEQ
20 PACKET (EA) ORAL ONCE
Refills: 0 | Status: DISCONTINUED | OUTPATIENT
Start: 2022-11-18 | End: 2022-11-18

## 2022-11-18 RX ORDER — PANTOPRAZOLE SODIUM 20 MG/1
40 TABLET, DELAYED RELEASE ORAL
Refills: 0 | Status: DISCONTINUED | OUTPATIENT
Start: 2022-11-18 | End: 2022-11-19

## 2022-11-18 RX ORDER — SODIUM CHLORIDE 9 MG/ML
1000 INJECTION, SOLUTION INTRAVENOUS
Refills: 0 | Status: DISCONTINUED | OUTPATIENT
Start: 2022-11-18 | End: 2022-11-19

## 2022-11-18 RX ORDER — AMPICILLIN SODIUM AND SULBACTAM SODIUM 250; 125 MG/ML; MG/ML
3 INJECTION, POWDER, FOR SUSPENSION INTRAMUSCULAR; INTRAVENOUS EVERY 6 HOURS
Refills: 0 | Status: COMPLETED | OUTPATIENT
Start: 2022-11-18 | End: 2022-11-19

## 2022-11-18 RX ORDER — ACETAMINOPHEN 500 MG
650 TABLET ORAL EVERY 6 HOURS
Refills: 0 | Status: DISCONTINUED | OUTPATIENT
Start: 2022-11-18 | End: 2022-11-19

## 2022-11-18 RX ORDER — OXYCODONE HYDROCHLORIDE 5 MG/1
5 TABLET ORAL ONCE
Refills: 0 | Status: DISCONTINUED | OUTPATIENT
Start: 2022-11-18 | End: 2022-11-18

## 2022-11-18 RX ORDER — KETOROLAC TROMETHAMINE 30 MG/ML
15 SYRINGE (ML) INJECTION EVERY 6 HOURS
Refills: 0 | Status: DISCONTINUED | OUTPATIENT
Start: 2022-11-18 | End: 2022-11-19

## 2022-11-18 RX ORDER — MAGNESIUM SULFATE 500 MG/ML
2 VIAL (ML) INJECTION ONCE
Refills: 0 | Status: COMPLETED | OUTPATIENT
Start: 2022-11-18 | End: 2022-11-19

## 2022-11-18 RX ORDER — ENOXAPARIN SODIUM 100 MG/ML
40 INJECTION SUBCUTANEOUS EVERY 24 HOURS
Refills: 0 | Status: DISCONTINUED | OUTPATIENT
Start: 2022-11-18 | End: 2022-11-19

## 2022-11-18 RX ADMIN — OXYCODONE HYDROCHLORIDE 5 MILLIGRAM(S): 5 TABLET ORAL at 20:10

## 2022-11-18 RX ADMIN — AMPICILLIN SODIUM AND SULBACTAM SODIUM 200 GRAM(S): 250; 125 INJECTION, POWDER, FOR SUSPENSION INTRAMUSCULAR; INTRAVENOUS at 17:48

## 2022-11-18 RX ADMIN — CHLORHEXIDINE GLUCONATE 1 APPLICATION(S): 213 SOLUTION TOPICAL at 08:57

## 2022-11-18 RX ADMIN — HYDROMORPHONE HYDROCHLORIDE 0.5 MILLIGRAM(S): 2 INJECTION INTRAMUSCULAR; INTRAVENOUS; SUBCUTANEOUS at 17:36

## 2022-11-18 RX ADMIN — HYDROMORPHONE HYDROCHLORIDE 0.5 MILLIGRAM(S): 2 INJECTION INTRAMUSCULAR; INTRAVENOUS; SUBCUTANEOUS at 13:16

## 2022-11-18 RX ADMIN — AMPICILLIN SODIUM AND SULBACTAM SODIUM 200 GRAM(S): 250; 125 INJECTION, POWDER, FOR SUSPENSION INTRAMUSCULAR; INTRAVENOUS at 05:13

## 2022-11-18 RX ADMIN — Medication 15 MILLIGRAM(S): at 21:39

## 2022-11-18 RX ADMIN — HYDROMORPHONE HYDROCHLORIDE 0.5 MILLIGRAM(S): 2 INJECTION INTRAMUSCULAR; INTRAVENOUS; SUBCUTANEOUS at 13:20

## 2022-11-18 RX ADMIN — HYDROMORPHONE HYDROCHLORIDE 0.5 MILLIGRAM(S): 2 INJECTION INTRAMUSCULAR; INTRAVENOUS; SUBCUTANEOUS at 12:56

## 2022-11-18 RX ADMIN — HYDROMORPHONE HYDROCHLORIDE 0.5 MILLIGRAM(S): 2 INJECTION INTRAMUSCULAR; INTRAVENOUS; SUBCUTANEOUS at 16:01

## 2022-11-18 RX ADMIN — Medication 650 MILLIGRAM(S): at 17:38

## 2022-11-18 RX ADMIN — HYDROMORPHONE HYDROCHLORIDE 0.5 MILLIGRAM(S): 2 INJECTION INTRAMUSCULAR; INTRAVENOUS; SUBCUTANEOUS at 12:44

## 2022-11-18 NOTE — CHART NOTE - NSCHARTNOTEFT_GEN_A_CORE
POST-OP CHECK    PROCEDURE: S/P laparoscopic cholecystectomy     S: Pt awake and alert resting comfortably in bed. Pain controlled. Pt denies N/V, SOB, CP, palpitations.     O:   VITAL SIGNS (Last 24 hrs):  T(C): 36.7 (11-18-22 @ 13:45), Max: 37.3 (11-18-22 @ 02:40)  HR: 98 (11-18-22 @ 13:45) (80 - 120)  BP: 112/57 (11-18-22 @ 13:45) (102/60 - 130/88)  RR: 18 (11-18-22 @ 13:45) (14 - 20)  SpO2: 96% (11-18-22 @ 13:45) (95% - 100%)  Daily Height in cm: 160.02 (18 Nov 2022 09:17)      I&O's Summary    18 Nov 2022 07:01  -  18 Nov 2022 17:59  --------------------------------------------------------  IN: 0 mL / OUT: 400 mL / NET: -400 mL      Diet, Regular:   Low Fat (LOWFAT)    PHYSICAL EXAM:    GEN: A&O, NAD  CV: s1,s2  LUNGS: cta b/l  ABD: soft, non-distended, per-incisional tenderness   INC: c/d/i    MEDICATIONS  (STANDING):  ampicillin/sulbactam  IVPB 3 Gram(s) IV Intermittent every 6 hours  chlorhexidine 4% Liquid 1 Application(s) Topical <User Schedule>  enoxaparin Injectable 40 milliGRAM(s) SubCutaneous every 24 hours  influenza   Vaccine 0.5 milliLiter(s) IntraMuscular once  lactated ringers. 1000 milliLiter(s) (100 mL/Hr) IV Continuous <Continuous>  pantoprazole    Tablet 40 milliGRAM(s) Oral before breakfast    MEDICATIONS  (PRN):  acetaminophen     Tablet .. 650 milliGRAM(s) Oral every 6 hours PRN Temp greater or equal to 38C (100.4F), Mild Pain (1 - 3)  HYDROmorphone  Injectable 0.5 milliGRAM(s) IV Push every 10 minutes PRN Severe Pain (7 - 10)  ketorolac   Injectable 15 milliGRAM(s) IV Push every 6 hours PRN Moderate Pain (4 - 6)  ondansetron Injectable 4 milliGRAM(s) IV Push once PRN Nausea and/or Vomiting  oxyCODONE    IR 5 milliGRAM(s) Oral once PRN Moderate Pain (4 - 6)      LABS:                        11.7   5.36  )-----------( 302      ( 18 Nov 2022 01:25 )             35.1     11-18    138  |  105  |  8<L>  ----------------------------<  97  3.8   |  24  |  0.6<L>    Ca    8.9      18 Nov 2022 01:25  Phos  3.8     11-18  Mg     2.0     11-18    TPro  6.2  /  Alb  4.1  /  TBili  0.8  /  DBili  0.2  /  AST  183<H>  /  ALT  360<H>  /  AlkPhos  135<H>  11-18    LIVER FUNCTIONS - ( 18 Nov 2022 01:25 )  Alb: 4.1 g/dL / Pro: 6.2 g/dL / ALK PHOS: 135 U/L / ALT: 360 U/L / AST: 183 U/L / GGT: x           PT/INR - ( 18 Nov 2022 01:25 )   PT: 12.10 sec;   INR: 1.06 ratio         PTT - ( 18 Nov 2022 01:25 )  PTT:32.2 sec    ASSESSMENT/PLAN:   23 y/o  Female  S/P laparoscopic cholecystectomy   -diet as tolerated  -ABX x 2 doses   -ambulate/OOB  -pain control PRN  -follow up PM labs  -likely discharge home in AM        SPECTRA: [id number]

## 2022-11-18 NOTE — BRIEF OPERATIVE NOTE - OPERATION/FINDINGS
Mildly inflamed gallbladder. Critical view achieved. Cystic and posterior cystic artery clipped. Cystic duct stapled using Endo LAVERNE 30mm vascular load. Hemostasis achieved

## 2022-11-18 NOTE — CHART NOTE - NSCHARTNOTEFT_GEN_A_CORE
PACU ANESTHESIA ADMISSION NOTE      Procedure: Laparoscopic cholecystectomy      Post op diagnosis:  Acute acalculous cholecystitis        ____  Intubated  TV:______       Rate: ______      FiO2: ______    x____  Patent Airway    __x__  Full return of protective reflexes    __x__  Full recovery from anesthesia / back to baseline     Vitals:   T: 98.5          R:  14                BP: 126/75                 Sat:  98%                 P: 98      Mental Status:  _x___ Awake   __x___ Alert   _____ Drowsy   _____ Sedated    Nausea/Vomiting:  _x___ NO  ______Yes,   See Post - Op Orders          Pain Scale (0-10):  _0____    Treatment: ____ None    ____ See Post - Op/PCA Orders    Post - Operative Fluids:   ____ Oral   ____ See Post - Op Orders    Plan: Discharge:   ____Home       __x___Floor     _____Critical Care    _____  Other:_________________    Comments:

## 2022-11-19 ENCOUNTER — TRANSCRIPTION ENCOUNTER (OUTPATIENT)
Age: 22
End: 2022-11-19

## 2022-11-19 VITALS
TEMPERATURE: 97 F | SYSTOLIC BLOOD PRESSURE: 111 MMHG | DIASTOLIC BLOOD PRESSURE: 57 MMHG | HEART RATE: 96 BPM | RESPIRATION RATE: 18 BRPM

## 2022-11-19 PROCEDURE — 99024 POSTOP FOLLOW-UP VISIT: CPT

## 2022-11-19 RX ORDER — OXYCODONE HYDROCHLORIDE 5 MG/1
1 TABLET ORAL
Qty: 16 | Refills: 0
Start: 2022-11-19 | End: 2022-11-22

## 2022-11-19 RX ORDER — ACETAMINOPHEN 500 MG
2 TABLET ORAL
Qty: 24 | Refills: 0
Start: 2022-11-19 | End: 2022-11-22

## 2022-11-19 RX ORDER — IBUPROFEN 200 MG
1 TABLET ORAL
Qty: 12 | Refills: 0
Start: 2022-11-19 | End: 2022-11-22

## 2022-11-19 RX ADMIN — CHLORHEXIDINE GLUCONATE 1 APPLICATION(S): 213 SOLUTION TOPICAL at 05:40

## 2022-11-19 RX ADMIN — AMPICILLIN SODIUM AND SULBACTAM SODIUM 200 GRAM(S): 250; 125 INJECTION, POWDER, FOR SUSPENSION INTRAMUSCULAR; INTRAVENOUS at 01:00

## 2022-11-19 RX ADMIN — Medication 650 MILLIGRAM(S): at 13:22

## 2022-11-19 RX ADMIN — Medication 15 MILLIGRAM(S): at 07:53

## 2022-11-19 RX ADMIN — Medication 12.5 GRAM(S): at 05:44

## 2022-11-19 RX ADMIN — PANTOPRAZOLE SODIUM 40 MILLIGRAM(S): 20 TABLET, DELAYED RELEASE ORAL at 05:40

## 2022-11-19 NOTE — PROGRESS NOTE ADULT - SUBJECTIVE AND OBJECTIVE BOX
GENERAL SURGERY PROGRESS NOTE    Patient: UCHE MAE , 22y ([birth year])Female   MRN: [id number]  Location: [place] [id number]  Visit: 11-17-22 Inpatient  Date: 11-18-22 @ 03:38    Hospital Day #: 3  Post-Op Day #: 0    Procedure/Dx/Injuries: Biliary colic    Events of past 24 hours: Stat preop labs obtained. Patient's abdominal exam was benign. Denies nausea, vomiting, flatus, and bowel movements. Has been ambulating and voiding normally.    PAST MEDICAL & SURGICAL HISTORY:  Intrahepatic cholestasis of pregnancy      No significant past surgical history          Vitals:   T(F): 97.2 (11-17-22 @ 21:14), Max: 97.7 (11-17-22 @ 13:00)  HR: 81 (11-17-22 @ 21:14)  BP: 108/58 (11-17-22 @ 21:14)  RR: 18 (11-17-22 @ 21:14)  SpO2: 99% (11-17-22 @ 19:56)      Diet, NPO      Fluids: lactated ringers.: Solution, 1000 milliLiter(s) infuse at 100 mL/Hr      I & O's:    Bowel Movement: : [] YES [x] NO  Flatus: : [] YES [x] NO    PHYSICAL EXAM:  General: NAD, AAOx3, calm and cooperative  HEENT: Normocephalic, Trachea ML  Cardiac: RRR S1, S2  Respiratory: Normal respiratory effort on RA  Abdomen: Soft, non-distended, non-tender, no rebound, no guarding.  Neuro: No focal deficits  Vascular: Extremities well perfused  Skin: Warm/dry, normal color, no jaundice    MEDICATIONS  (STANDING):  ampicillin/sulbactam  IVPB      ampicillin/sulbactam  IVPB 3 Gram(s) IV Intermittent every 6 hours  chlorhexidine 4% Liquid 1 Application(s) Topical <User Schedule>  enoxaparin Injectable 40 milliGRAM(s) SubCutaneous every 24 hours  influenza   Vaccine 0.5 milliLiter(s) IntraMuscular once  lactated ringers. 1000 milliLiter(s) (100 mL/Hr) IV Continuous <Continuous>  pantoprazole    Tablet 40 milliGRAM(s) Oral before breakfast  potassium chloride  20 mEq/100 mL IVPB 20 milliEquivalent(s) IV Intermittent once    MEDICATIONS  (PRN):  acetaminophen     Tablet .. 650 milliGRAM(s) Oral every 6 hours PRN Temp greater or equal to 38C (100.4F), Mild Pain (1 - 3)  ketorolac   Injectable 15 milliGRAM(s) IV Push every 6 hours PRN Moderate Pain (4 - 6)  oxyCODONE    IR 5 milliGRAM(s) Oral every 6 hours PRN Severe Pain (7 - 10)      DVT PROPHYLAXIS: enoxaparin Injectable 40 milliGRAM(s) SubCutaneous every 24 hours    GI PROPHYLAXIS: pantoprazole    Tablet 40 milliGRAM(s) Oral before breakfast    ANTICOAGULATION:   ANTIBIOTICS:  ampicillin/sulbactam  IVPB    ampicillin/sulbactam  IVPB 3 Gram(s)            LAB/STUDIES:  Labs:  CAPILLARY BLOOD GLUCOSE                              11.7   5.36  )-----------( 302      ( 18 Nov 2022 01:25 )             35.1       Auto Neutrophil %: 51.9 % (11-18-22 @ 01:25)  Auto Immature Granulocyte %: 0.4 % (11-18-22 @ 01:25)    11-18    138  |  105  |  8<L>  ----------------------------<  97  3.8   |  24  |  0.6<L>      Calcium, Total Serum: 8.9 mg/dL (11-18-22 @ 01:25)      LFTs:             6.2  | 0.8  | 183      ------------------[135     ( 18 Nov 2022 01:25 )  4.1  | 0.2  | 360         Lipase:x      Amylase:x             Coags:     12.10  ----< 1.06    ( 18 Nov 2022 01:25 )     32.2        CARDIAC MARKERS ( 16 Nov 2022 18:35 )  x     / <0.01 ng/mL / x     / x     / x        IMAGING:  < from: US Abdomen Upper Quadrant Right (11.17.22 @ 00:26) >  IMPRESSION:  Cholelithiasis without sonographic evidence of acute cholecystitis.   Otherwise unremarkable exam.    --- End of Report ---    < end of copied text >    · Assessment	  22yF w/ PMHx of intrahepatic cholelithiasis of pregnancy presents to ED with chest pain/epigastric pain and b/l flank pain. Pain has been intermittent for the past month but has become more consistent in the last week. Associated with nausea, but denies emesis or fevers. Characterized as a squeezing sensation.. Physical exam findings, imaging, and labs as documented above.     PLAN:  - Booked for Amanda mendez 11/18  - Preoped and COVID up-to-date  - NPO on LR @ 100    Trauma SPECTRA: [id number]
GENERAL SURGERY PROGRESS NOTE    Patient: UCHE MAE , 22y ([birth year])Female   MRN: [id number]  Location: [address]  Visit: 11-17-22 Inpatient  Date: 11-19-22 @ 01:37    Hospital Day #: 4  Post-Op Day #: 1    Procedure/Dx/Injuries: s/p laparoscopic cholecystectomy     PAST MEDICAL & SURGICAL HISTORY:  Intrahepatic cholestasis of pregnancy    No significant past surgical history    Vitals:   T(F): 97.2 (11-18-22 @ 15:00), Max: 99.1 (11-18-22 @ 02:40)  HR: 101 (11-18-22 @ 15:00)  BP: 107/70 (11-18-22 @ 15:00)  RR: 18 (11-18-22 @ 15:00)  SpO2: 96% (11-18-22 @ 13:45)    Diet, Regular:   Low Fat (LOWFAT)    Fluids: lactated ringers.: Solution, 1000 milliLiter(s) infuse at 100 mL/Hr  Provider's Contact #: [phone]    I & O's:    PHYSICAL EXAM:  General: NAD, AAOx3, calm and cooperative  Cardiac: RRR S1, S2  Respiratory: normal respiratory effort  Abdomen: Soft, non-distended, non-tender  Skin: Warm/dry, normal color, no jaundice  Incision/wound: healing well, dressings in place, clean, dry and intact    MEDICATIONS  (STANDING):  ampicillin/sulbactam  IVPB 3 Gram(s) IV Intermittent every 6 hours  chlorhexidine 4% Liquid 1 Application(s) Topical <User Schedule>  enoxaparin Injectable 40 milliGRAM(s) SubCutaneous every 24 hours  influenza   Vaccine 0.5 milliLiter(s) IntraMuscular once  lactated ringers. 1000 milliLiter(s) (100 mL/Hr) IV Continuous <Continuous>  magnesium sulfate  IVPB 2 Gram(s) IV Intermittent once  pantoprazole    Tablet 40 milliGRAM(s) Oral before breakfast    MEDICATIONS  (PRN):  acetaminophen     Tablet .. 650 milliGRAM(s) Oral every 6 hours PRN Temp greater or equal to 38C (100.4F), Mild Pain (1 - 3)  HYDROmorphone  Injectable 0.5 milliGRAM(s) IV Push every 10 minutes PRN Severe Pain (7 - 10)  ketorolac   Injectable 15 milliGRAM(s) IV Push every 6 hours PRN Moderate Pain (4 - 6)  ondansetron Injectable 4 milliGRAM(s) IV Push once PRN Nausea and/or Vomiting    DVT PROPHYLAXIS: enoxaparin Injectable 40 milliGRAM(s) SubCutaneous every 24 hours    GI PROPHYLAXIS: pantoprazole    Tablet 40 milliGRAM(s) Oral before breakfast    ANTICOAGULATION:   ANTIBIOTICS:  ampicillin/sulbactam  IVPB 3 Gram(s)    LAB/STUDIES:  Labs:  CAPILLARY BLOOD GLUCOSE                        11.9   15.97 )-----------( 337      ( 18 Nov 2022 21:59 )             35.4       Auto Neutrophil %: 87.0 % (11-18-22 @ 21:59)  Auto Immature Granulocyte %: 0.3 % (11-18-22 @ 21:59)    11-18    137  |  103  |  7<L>  ----------------------------<  137<H>  4.3   |  22  |  0.6<L>    Calcium, Total Serum: 9.4 mg/dL (11-18-22 @ 21:59)    LFTs:             6.5  | 0.5  | 71       ------------------[123     ( 18 Nov 2022 21:59 )  4.2  | <0.2 | 250         Lipase:x      Amylase:x        Coags:     12.10  ----< 1.06    ( 18 Nov 2022 01:25 )     32.2

## 2022-11-19 NOTE — DISCHARGE NOTE NURSING/CASE MANAGEMENT/SOCIAL WORK - NSDCPEFALRISK_GEN_ALL_CORE
For information on Fall & Injury Prevention, visit: [email or web address] OR  [email or web address] OR  https://www.cdc.gov/steadi/patient.html

## 2022-11-19 NOTE — DISCHARGE NOTE PROVIDER - NSDCCPCAREPLAN_GEN_ALL_CORE_FT
PRINCIPAL DISCHARGE DIAGNOSIS  Diagnosis: Cholelithiasis  Assessment and Plan of Treatment:       SECONDARY DISCHARGE DIAGNOSES  Diagnosis: Elevated liver enzymes  Assessment and Plan of Treatment:

## 2022-11-19 NOTE — DISCHARGE NOTE PROVIDER - HOSPITAL COURSE
Patient is a 22 female with PMHx of intrahepatic cholelithiasis of pregnancy presents to ED with chest pain/epigastric pain and b/l flank pain. Pain has been intermittent for the past month but has become more consistent in the last week. Associated with nausea, but denies emesis or fevers. Characterized as a squeezing sensation. Denies any alleviating or aggravating factors. Patient reports improvement of pain since arriving to the ED. Imaging reveals cholelithiasis without sonographic evidence of acute cholecystitis. Patient underwent uncomplicated laparoscopic cholecystectomy on 11/18/22. Patient tolerated procedure well without any issues. 11/19/22: pain is relatively well controlled with expected tenderness to palpation at surgical incision sites. Patient denies nausea, vomiting, fever, and chills. Only complaint is gas pain following procedure that radiates to shoulders. She is hemodynamically stable, afebrile, and voiding spontaneously. Medically cleared for discharge today 11/19/22.

## 2022-11-19 NOTE — DISCHARGE NOTE PROVIDER - NSDCFUADDINST_GEN_ALL_CORE_FT
Wound Care: You may shower at home. Allow water to run over dressings. Do not scrub incision sites. Pat area dry.   Pain Control: You may take tylenol and motrin as needed for pain. Reserve oxycodone for severe pain. Take as prescribed. Do not drive while taking narcotics.   Follow up: Call the office on Monday to schedule follow up appointment in two weeks.   Activity: Avoid heavy lifting (>10-15lbs for the next few weeks)

## 2022-11-19 NOTE — PROGRESS NOTE ADULT - ASSESSMENT
ASSESSMENT/PLAN:  21 y/o  Female S/P laparoscopic cholecystectomy     PLAN:  -diet as tolerated  -finish ABX x 2 doses   -ambulate/OOB  -pain control PRN  -likely discharge home today    [phone]

## 2022-11-19 NOTE — DISCHARGE NOTE PROVIDER - NSDCMRMEDTOKEN_GEN_ALL_CORE_FT
acetaminophen 650 mg oral tablet, extended release: 2 tab(s) orally 3 times a day   ibuprofen 800 mg oral tablet: 1 tab(s) orally 3 times a day   oxyCODONE 5 mg oral capsule: 1 cap(s) orally 4 times a day MDD:4

## 2022-11-19 NOTE — DISCHARGE NOTE PROVIDER - CARE PROVIDER_API CALL
Wes Pizarro)  Surgery; Surgical Critical Care  [facility], [address] [place] [address]  Phone: [phone]  Fax: [phone]  Follow Up Time:

## 2022-11-19 NOTE — DISCHARGE NOTE NURSING/CASE MANAGEMENT/SOCIAL WORK - PATIENT PORTAL LINK FT
You can access the FollowMyHealth Patient Portal offered by [facility] by registering at the following website: [email or web address]. By joining [facility]’s FollowMyHealth portal, you will also be able to view your health information using other applications (apps) compatible with our system.

## 2022-11-22 PROBLEM — O26.619 LIVER AND BILIARY TRACT DISORDERS IN PREGNANCY, UNSPECIFIED TRIMESTER: Chronic | Status: ACTIVE | Noted: 2022-11-17

## 2022-11-23 PROBLEM — Z00.00 ENCOUNTER FOR PREVENTIVE HEALTH EXAMINATION: Status: ACTIVE | Noted: 2022-11-23

## 2022-11-23 LAB — SURGICAL PATHOLOGY STUDY: SIGNIFICANT CHANGE UP

## 2022-11-28 ENCOUNTER — APPOINTMENT (OUTPATIENT)
Dept: SURGERY | Facility: CLINIC | Age: 22
End: 2022-11-28

## 2022-11-28 VITALS
SYSTOLIC BLOOD PRESSURE: 118 MMHG | HEART RATE: 92 BPM | DIASTOLIC BLOOD PRESSURE: 58 MMHG | BODY MASS INDEX: 21.66 KG/M2 | HEIGHT: 63 IN | WEIGHT: 122.25 LBS | OXYGEN SATURATION: 98 % | TEMPERATURE: 97.4 F

## 2022-11-28 PROCEDURE — 99212 OFFICE O/P EST SF 10 MIN: CPT | Mod: 24

## 2022-11-29 DIAGNOSIS — K80.00 CALCULUS OF GALLBLADDER WITH ACUTE CHOLECYSTITIS WITHOUT OBSTRUCTION: ICD-10-CM

## 2022-11-29 DIAGNOSIS — R79.89 OTHER SPECIFIED ABNORMAL FINDINGS OF BLOOD CHEMISTRY: ICD-10-CM

## 2022-11-29 DIAGNOSIS — K80.20 CALCULUS OF GALLBLADDER WITHOUT CHOLECYSTITIS WITHOUT OBSTRUCTION: ICD-10-CM

## 2022-12-06 NOTE — PLAN
[FreeTextEntry1] : Low fat diet.\par Avoid heavy lifting x 2 months\par Pathology report was reviewed and shoed chronic cholecystitis - this was discussed with the patient.\par Follow up as needed.

## 2022-12-06 NOTE — HISTORY OF PRESENT ILLNESS
[de-identified] : This is 23 y/o female who underwent Laparoscopic Cholecystectomy on 11/18/2022 [de-identified] : Patient is doing well.\par Tolerates regular diet.\par Wounds healed well.

## 2022-12-06 NOTE — PHYSICAL EXAM
[JVD] : no jugular venous distention  [Normal Thyroid] : the thyroid was normal [Normal Breath Sounds] : Normal breath sounds [Normal Heart Sounds] : normal heart sounds [Abdominal Masses] : No abdominal masses [Abdomen Tenderness] : ~T ~M No abdominal tenderness [Tender] : was nontender [Enlarged] : not enlarged [No Rash or Lesion] : No rash or lesion [Alert] : alert [Oriented to Person] : oriented to person [Oriented to Place] : oriented to place [Oriented to Time] : oriented to time [Calm] : calm [de-identified] : comfortable [de-identified] : ANTHONY [de-identified] : supple [de-identified] : soft, nontender